# Patient Record
Sex: MALE | Race: WHITE | NOT HISPANIC OR LATINO | URBAN - METROPOLITAN AREA
[De-identification: names, ages, dates, MRNs, and addresses within clinical notes are randomized per-mention and may not be internally consistent; named-entity substitution may affect disease eponyms.]

---

## 2017-09-06 ENCOUNTER — IMPORTED ENCOUNTER (OUTPATIENT)
Dept: URBAN - METROPOLITAN AREA CLINIC 9 | Facility: CLINIC | Age: 72
End: 2017-09-06

## 2017-12-07 NOTE — PATIENT DISCUSSION
ECTROPION, OU VISUALLY SIGNIFICANT TO THE PATIENT. RECOMMEND ARTIFICIAL TEARS AND RX EMYCIN HS OU. FOLLOW UP AS SCHEDULED. DISCUSSED OPTION OF CONSULT FOR SURGERY WITH DR Stan Greenwood. THE FAMILY WILL CONSIDER THIS OPTION AND LET US KNOW. WRITTEN RX FOR E-MYCIN GIVEN TODAY.

## 2018-05-04 ENCOUNTER — IMPORTED ENCOUNTER (OUTPATIENT)
Dept: URBAN - METROPOLITAN AREA CLINIC 9 | Facility: CLINIC | Age: 73
End: 2018-05-04

## 2018-08-08 ENCOUNTER — IMPORTED ENCOUNTER (OUTPATIENT)
Dept: URBAN - METROPOLITAN AREA CLINIC 9 | Facility: CLINIC | Age: 73
End: 2018-08-08

## 2018-10-22 NOTE — PATIENT DISCUSSION
Continue: Refresh Celluvisc (carboxymethylcellulose sodium): dropperette,gel: 1% 1 drop five times a day into right eye

## 2018-10-22 NOTE — PATIENT DISCUSSION
POSTERIOR VITREOUS DETACHMENT, OU:  NO HOLES. NO TEARS. RETINAL  DETACHMENT WARNINGS DISCUSSED. Eboni Cárdenas RETURN FOR FOLLOW-UP AS SCHEDULED.

## 2018-10-22 NOTE — PATIENT DISCUSSION
ECTROPION, OU: RX ARTIFICIAL TEARS AS NEEDED IN BOTH EYES. CONSULT DR Blayne Roldan FOR SURGERY WHEN FAMILY DECIDES PATIENT IS READY.

## 2018-12-07 ENCOUNTER — IMPORTED ENCOUNTER (OUTPATIENT)
Dept: URBAN - METROPOLITAN AREA CLINIC 9 | Facility: CLINIC | Age: 73
End: 2018-12-07

## 2019-05-02 ENCOUNTER — IMPORTED ENCOUNTER (OUTPATIENT)
Dept: URBAN - METROPOLITAN AREA CLINIC 9 | Facility: CLINIC | Age: 74
End: 2019-05-02

## 2019-08-09 ENCOUNTER — IMPORTED ENCOUNTER (OUTPATIENT)
Dept: URBAN - METROPOLITAN AREA CLINIC 9 | Facility: CLINIC | Age: 74
End: 2019-08-09

## 2019-08-14 ENCOUNTER — IMPORTED ENCOUNTER (OUTPATIENT)
Dept: URBAN - METROPOLITAN AREA CLINIC 9 | Facility: CLINIC | Age: 74
End: 2019-08-14

## 2019-11-11 ENCOUNTER — IMPORTED ENCOUNTER (OUTPATIENT)
Dept: URBAN - METROPOLITAN AREA CLINIC 9 | Facility: CLINIC | Age: 74
End: 2019-11-11

## 2020-01-28 ENCOUNTER — IMPORTED ENCOUNTER (OUTPATIENT)
Dept: URBAN - METROPOLITAN AREA CLINIC 9 | Facility: CLINIC | Age: 75
End: 2020-01-28

## 2020-01-31 ENCOUNTER — IMPORTED ENCOUNTER (OUTPATIENT)
Dept: URBAN - METROPOLITAN AREA CLINIC 9 | Facility: CLINIC | Age: 75
End: 2020-01-31

## 2020-06-16 ENCOUNTER — MOHS SURGERY-ROUTINE (OUTPATIENT)
Dept: URBAN - METROPOLITAN AREA CLINIC 12 | Facility: CLINIC | Age: 75
Setting detail: DERMATOLOGY
End: 2020-06-16

## 2020-06-16 DIAGNOSIS — D48.5 NEOPLASM OF UNCERTAIN BEHAVIOR OF SKIN: ICD-10-CM

## 2020-06-16 PROCEDURE — 17311 MOHS 1 STAGE H/N/HF/G: CPT

## 2020-06-16 PROCEDURE — 17312 MOHS ADDL STAGE: CPT

## 2020-06-16 PROCEDURE — 14061 TIS TRNFR E/N/E/L10.1-30SQCM: CPT

## 2020-06-16 RX ORDER — SULFAMETHOXAZOLE AND TRIMETHOPRIM 800; 160 MG/1; MG/1
1 TABLET TABLET ORAL BID
Qty: 10 | Refills: 0
Start: 2020-06-16

## 2020-06-23 ENCOUNTER — SUTURE REMOVAL (OUTPATIENT)
Dept: URBAN - METROPOLITAN AREA CLINIC 12 | Facility: CLINIC | Age: 75
Setting detail: DERMATOLOGY
End: 2020-06-23

## 2020-06-23 DIAGNOSIS — L57.0 ACTINIC KERATOSIS: ICD-10-CM

## 2020-06-23 PROCEDURE — 99024 POSTOP FOLLOW-UP VISIT: CPT

## 2020-08-12 ENCOUNTER — PREPPED CHART (OUTPATIENT)
Dept: URBAN - METROPOLITAN AREA CLINIC 9 | Facility: CLINIC | Age: 75
End: 2020-08-12

## 2020-08-12 ENCOUNTER — IMPORTED ENCOUNTER (OUTPATIENT)
Dept: URBAN - METROPOLITAN AREA CLINIC 9 | Facility: CLINIC | Age: 75
End: 2020-08-12

## 2021-10-11 ENCOUNTER — ESTABLISHED COMPREHENSIVE EXAM (OUTPATIENT)
Dept: URBAN - METROPOLITAN AREA CLINIC 9 | Facility: CLINIC | Age: 76
End: 2021-10-11

## 2021-10-11 DIAGNOSIS — H40.033: ICD-10-CM

## 2021-10-11 DIAGNOSIS — H25.13: ICD-10-CM

## 2021-10-11 DIAGNOSIS — H33.312: ICD-10-CM

## 2021-10-11 PROCEDURE — 92015 DETERMINE REFRACTIVE STATE: CPT

## 2021-10-11 PROCEDURE — 92133 CPTRZD OPH DX IMG PST SGM ON: CPT

## 2021-10-11 PROCEDURE — 92014 COMPRE OPH EXAM EST PT 1/>: CPT

## 2021-10-11 ASSESSMENT — VISUAL ACUITY
OD_SC: 20/80
OD_CC: 20/25
OS_SC: 20/80-1
OU_SC: 20/80
OS_CC: 20/30-2
OU_CC: 20/25-1

## 2021-10-11 ASSESSMENT — KERATOMETRY
OD_K1POWER_DIOPTERS: 44.25
OD_K2POWER_DIOPTERS: 45.00
OD_AXISANGLE_DEGREES: 113
OS_AXISANGLE_DEGREES: 107
OD_AXISANGLE2_DEGREES: 23
OS_K2POWER_DIOPTERS: 45.75
OS_AXISANGLE2_DEGREES: 17
OS_K1POWER_DIOPTERS: 45.25

## 2021-10-11 ASSESSMENT — TONOMETRY
OS_IOP_MMHG: 17
OD_IOP_MMHG: 20

## 2021-10-18 ASSESSMENT — KERATOMETRY
OD_AXISANGLE2_DEGREES: 171
OD_AXISANGLE2_DEGREES: 163
OS_K1POWER_DIOPTERS: 45
OS_AXISANGLE_DEGREES: 100
OS_K2POWER_DIOPTERS: 45.75
OD_K1POWER_DIOPTERS: 44.75
OS_K1POWER_DIOPTERS: 45.25
OS_AXISANGLE_DEGREES: 90
OD_K1POWER_DIOPTERS: 44.75
OS_AXISANGLE2_DEGREES: 10
OD_K2POWER_DIOPTERS: 45.25
OD_AXISANGLE_DEGREES: 73
OS_AXISANGLE2_DEGREES: 180
OD_K2POWER_DIOPTERS: 45
OD_AXISANGLE_DEGREES: 81
OS_K2POWER_DIOPTERS: 45.75

## 2021-10-18 ASSESSMENT — VISUAL ACUITY
OS_CC: 20/30 +2 SN
OD_CC: 20/20 - SN
OD_CC: 20/20 - SN
OD_CC: 20/25 +2 SN
OS_CC: 20/25 SN
OS_CC: 20/25 - SN
OS_CC: 20/25 -2 SN
OD_CC: 20/20 SN
OD_CC: 20/20 SN
OS_SC: 20/60 SN
OD_SC: 20/200 SN
OD_CC: 20/20 SN
OD_CC: 20/20 - SN
OD_SC: 20/60 + SN
OS_CC: 20/20 -2 SN
OD_CC: 20/25 -2 SN
OD_CC: 20/25 +2 SN
OS_SC: 20/60 -2 SN
OS_CC: 20/20 -2 SN
OD_CC: 20/20 - SN
OD_CC: 20/25 +2 SN
OS_CC: 20/25 -2 SN
OD_CC: 20/20 - SN
OS_SC: 20/100 - SN
OS_CC: 20/20 - SN
OD_SC: 20/100 - SN
OS_CC: 20/20 - SN
OS_CC: 20/25 + SN
OS_CC: 20/20 SN

## 2021-10-18 ASSESSMENT — TONOMETRY
OD_IOP_MMHG: 21
OS_IOP_MMHG: 12
OS_IOP_MMHG: 16
OS_IOP_MMHG: 17
OS_IOP_MMHG: 17
OS_IOP_MMHG: 16
OS_IOP_MMHG: 17
OD_IOP_MMHG: 16
OD_IOP_MMHG: 19
OD_IOP_MMHG: 16
OD_IOP_MMHG: 16
OS_IOP_MMHG: 14
OS_IOP_MMHG: 20
OD_IOP_MMHG: 22
OD_IOP_MMHG: 21
OD_IOP_MMHG: 17
OS_IOP_MMHG: 14
OS_IOP_MMHG: 21
OD_IOP_MMHG: 15
OD_IOP_MMHG: 19

## 2022-02-21 ENCOUNTER — DIAGNOSTICS ONLY (OUTPATIENT)
Dept: URBAN - METROPOLITAN AREA CLINIC 9 | Facility: CLINIC | Age: 77
End: 2022-02-21

## 2022-02-21 DIAGNOSIS — H40.033: ICD-10-CM

## 2022-02-21 PROCEDURE — 92083 EXTENDED VISUAL FIELD XM: CPT

## 2022-02-21 ASSESSMENT — KERATOMETRY
OS_AXISANGLE_DEGREES: 107
OD_K1POWER_DIOPTERS: 44.25
OD_AXISANGLE_DEGREES: 113
OD_K2POWER_DIOPTERS: 45.00
OS_K1POWER_DIOPTERS: 45.25
OD_AXISANGLE2_DEGREES: 23
OS_AXISANGLE2_DEGREES: 17
OS_K2POWER_DIOPTERS: 45.75

## 2022-04-25 ENCOUNTER — ESTABLISHED PATIENT (OUTPATIENT)
Dept: URBAN - METROPOLITAN AREA CLINIC 9 | Facility: CLINIC | Age: 77
End: 2022-04-25

## 2022-04-25 DIAGNOSIS — H40.033: ICD-10-CM

## 2022-04-25 PROCEDURE — 99213 OFFICE O/P EST LOW 20 MIN: CPT

## 2022-04-25 ASSESSMENT — TONOMETRY
OS_IOP_MMHG: 17
OD_IOP_MMHG: 16

## 2022-04-25 ASSESSMENT — VISUAL ACUITY
OD_SC: 20/25
OU_SC: 20/20-1
OS_SC: 20/25-2

## 2022-06-20 RX ORDER — SILDENAFIL 100 MG/1
TABLET, FILM COATED ORAL
COMMUNITY

## 2022-06-20 RX ORDER — LANOLIN ALCOHOL/MO/W.PET/CERES
3 CREAM (GRAM) TOPICAL NIGHTLY PRN
COMMUNITY

## 2022-06-20 RX ORDER — CLORAZEPATE DIPOTASSIUM 7.5 MG/1
TABLET ORAL
COMMUNITY
Start: 2021-11-18

## 2022-06-20 RX ORDER — LOSARTAN POTASSIUM 50 MG/1
TABLET ORAL
COMMUNITY
Start: 2021-08-31 | End: 2022-10-25 | Stop reason: SDUPTHER

## 2022-06-20 RX ORDER — LATANOPROST 50 UG/ML
SOLUTION/ DROPS OPHTHALMIC
COMMUNITY

## 2022-06-20 RX ORDER — AMLODIPINE BESYLATE 5 MG/1
10 TABLET ORAL DAILY
COMMUNITY
Start: 2021-11-10 | End: 2022-10-25 | Stop reason: SDUPTHER

## 2022-06-20 RX ORDER — DIPHENHYDRAMINE HCL 25 MG
TABLET ORAL
COMMUNITY
End: 2022-10-25

## 2022-09-12 PROBLEM — M75.01 ADHESIVE CAPSULITIS OF RIGHT SHOULDER: Status: ACTIVE | Noted: 2022-09-12

## 2022-09-13 PROBLEM — M72.0 DUPUYTREN'S CONTRACTURE OF RIGHT HAND: Status: ACTIVE | Noted: 2022-09-13

## 2022-10-17 ENCOUNTER — PREPPED CHART (OUTPATIENT)
Dept: URBAN - METROPOLITAN AREA CLINIC 9 | Facility: CLINIC | Age: 77
End: 2022-10-17

## 2022-10-19 PROBLEM — M25.511 PAIN IN RIGHT SHOULDER: Status: ACTIVE | Noted: 2022-10-19

## 2022-10-19 PROBLEM — G25.81 RESTLESS LEGS SYNDROME: Status: ACTIVE | Noted: 2022-10-19

## 2022-10-19 PROBLEM — E78.00 HYPERCHOLESTEREMIA: Status: ACTIVE | Noted: 2022-10-19

## 2022-10-19 PROBLEM — G47.33 OBSTRUCTIVE SLEEP APNEA: Status: ACTIVE | Noted: 2022-10-19

## 2022-10-19 PROBLEM — G89.29 OTHER CHRONIC PAIN: Status: ACTIVE | Noted: 2022-10-19

## 2022-10-19 PROBLEM — H40.1131 PRIMARY OPEN ANGLE GLAUCOMA OF BOTH EYES, MILD STAGE: Status: ACTIVE | Noted: 2022-10-19

## 2022-10-19 PROBLEM — K21.9 GASTROESOPHAGEAL REFLUX DISEASE: Status: ACTIVE | Noted: 2022-10-19

## 2022-10-19 PROBLEM — M25.512 PAIN IN LEFT SHOULDER: Status: ACTIVE | Noted: 2022-10-19

## 2022-10-19 PROBLEM — M16.11 ARTHRITIS OF RIGHT HIP: Status: ACTIVE | Noted: 2022-10-19

## 2022-10-19 PROBLEM — N40.1 BENIGN PROSTATIC HYPERPLASIA WITH LOWER URINARY TRACT SYMPTOMS: Status: ACTIVE | Noted: 2022-10-19

## 2022-10-19 PROBLEM — N52.9 ERECTILE DYSFUNCTION: Status: ACTIVE | Noted: 2022-10-19

## 2022-10-19 PROBLEM — R91.1 PULMONARY NODULE: Status: ACTIVE | Noted: 2022-10-19

## 2022-10-19 PROBLEM — H40.9 GLAUCOMA OF BOTH EYES: Status: ACTIVE | Noted: 2022-10-19

## 2022-10-19 PROBLEM — F41.9 ANXIETY: Status: ACTIVE | Noted: 2022-10-19

## 2022-10-19 PROBLEM — S79.911A INJURY OF RIGHT HIP: Status: ACTIVE | Noted: 2022-10-19

## 2022-10-19 PROBLEM — J31.2 SORE THROAT, CHRONIC: Status: ACTIVE | Noted: 2022-10-19

## 2022-10-19 PROBLEM — R55 SYNCOPE: Status: ACTIVE | Noted: 2022-10-19

## 2022-10-19 PROBLEM — I10 ESSENTIAL HYPERTENSION: Status: ACTIVE | Noted: 2022-10-19

## 2023-05-10 ENCOUNTER — ESTABLISHED PATIENT (OUTPATIENT)
Dept: URBAN - METROPOLITAN AREA CLINIC 9 | Facility: CLINIC | Age: 78
End: 2023-05-10

## 2023-05-10 DIAGNOSIS — H33.312: ICD-10-CM

## 2023-05-10 DIAGNOSIS — H43.813: ICD-10-CM

## 2023-05-10 DIAGNOSIS — D31.32: ICD-10-CM

## 2023-05-10 PROCEDURE — 92014 COMPRE OPH EXAM EST PT 1/>: CPT

## 2023-05-10 PROCEDURE — 92250 FUNDUS PHOTOGRAPHY W/I&R: CPT

## 2023-05-10 ASSESSMENT — VISUAL ACUITY
OS_PH: 20/30-1
OD_CC: 20/25-1
OS_CC: 20/40-1

## 2023-05-10 ASSESSMENT — TONOMETRY
OS_IOP_MMHG: 13
OD_IOP_MMHG: 14

## 2024-02-12 ENCOUNTER — ESTABLISHED PATIENT (OUTPATIENT)
Facility: LOCATION | Age: 79
End: 2024-02-12

## 2024-02-12 DIAGNOSIS — H40.033: ICD-10-CM

## 2024-02-12 DIAGNOSIS — H25.13: ICD-10-CM

## 2024-02-12 DIAGNOSIS — H52.223: ICD-10-CM

## 2024-02-12 DIAGNOSIS — H43.813: ICD-10-CM

## 2024-02-12 DIAGNOSIS — D31.32: ICD-10-CM

## 2024-02-12 PROCEDURE — 92015 DETERMINE REFRACTIVE STATE: CPT

## 2024-02-12 PROCEDURE — 92133 CPTRZD OPH DX IMG PST SGM ON: CPT

## 2024-02-12 PROCEDURE — 92014 COMPRE OPH EXAM EST PT 1/>: CPT

## 2024-02-12 ASSESSMENT — KERATOMETRY
OS_K2POWER_DIOPTERS: 46.00
OD_K2POWER_DIOPTERS: 45.25
OS_AXISANGLE2_DEGREES: 175
OD_AXISANGLE_DEGREES: 84
OD_K1POWER_DIOPTERS: 44.50
OD_AXISANGLE2_DEGREES: 174
OS_K1POWER_DIOPTERS: 45.00
OS_AXISANGLE_DEGREES: 85

## 2024-02-12 ASSESSMENT — VISUAL ACUITY
OS_GLARE: 20/200
OD_GLARE: 20/80
OS_CC: 20/30
OD_SC: 20/60
OD_CC: 20/30
OS_SC: 20/200

## 2024-02-12 ASSESSMENT — TONOMETRY
OD_IOP_MMHG: 18
OS_IOP_MMHG: 18

## 2024-02-16 ENCOUNTER — TECH ONLY (OUTPATIENT)
Facility: LOCATION | Age: 79
End: 2024-02-16

## 2024-02-16 DIAGNOSIS — H40.033: ICD-10-CM

## 2024-02-16 PROCEDURE — 92083 EXTENDED VISUAL FIELD XM: CPT

## 2024-08-16 ENCOUNTER — FOLLOW UP (OUTPATIENT)
Facility: LOCATION | Age: 79
End: 2024-08-16

## 2024-08-16 DIAGNOSIS — H40.033: ICD-10-CM

## 2024-08-16 PROCEDURE — 99213 OFFICE O/P EST LOW 20 MIN: CPT

## 2024-08-16 ASSESSMENT — VISUAL ACUITY
OS_SC: 20/100
OU_SC: J1
OD_SC: J2
OU_SC: 20/60
OD_SC: 20/40
OS_SC: J2

## 2024-08-16 ASSESSMENT — TONOMETRY
OD_IOP_MMHG: 20
OD_IOP_MMHG: 23
OS_IOP_MMHG: 20
OS_IOP_MMHG: 19

## 2025-02-13 ENCOUNTER — COMPREHENSIVE EXAM (OUTPATIENT)
Age: 80
End: 2025-02-13

## 2025-02-13 DIAGNOSIS — H25.13: ICD-10-CM

## 2025-02-13 DIAGNOSIS — H52.223: ICD-10-CM

## 2025-02-13 DIAGNOSIS — D31.32: ICD-10-CM

## 2025-02-13 DIAGNOSIS — H40.033: ICD-10-CM

## 2025-02-13 PROCEDURE — 92014 COMPRE OPH EXAM EST PT 1/>: CPT

## 2025-02-13 PROCEDURE — 92015 DETERMINE REFRACTIVE STATE: CPT

## 2025-02-13 PROCEDURE — 92133 CPTRZD OPH DX IMG PST SGM ON: CPT

## 2025-05-29 ENCOUNTER — APPOINTMENT (OUTPATIENT)
Age: 80
Setting detail: DERMATOLOGY
End: 2025-05-29

## 2025-05-29 PROBLEM — C44.91 BASAL CELL CARCINOMA OF SKIN, UNSPECIFIED: Status: ACTIVE | Noted: 2025-05-29

## 2025-05-29 PROBLEM — C44.311 BASAL CELL CARCINOMA OF SKIN OF NOSE: Status: ACTIVE | Noted: 2025-05-29

## 2025-05-29 PROCEDURE — 14061 TIS TRNFR E/N/E/L10.1-30SQCM: CPT

## 2025-05-29 PROCEDURE — OTHER MOHS SURGERY: OTHER

## 2025-05-29 PROCEDURE — 99204 OFFICE O/P NEW MOD 45 MIN: CPT | Mod: 57

## 2025-05-29 PROCEDURE — OTHER SURGICAL DECISION MAKING: OTHER

## 2025-05-29 PROCEDURE — OTHER MIPS QUALITY: OTHER

## 2025-05-29 PROCEDURE — 17311 MOHS 1 STAGE H/N/HF/G: CPT

## 2025-05-29 PROCEDURE — 17312 MOHS ADDL STAGE: CPT

## 2025-05-29 PROCEDURE — OTHER COUNSELING: OTHER

## 2025-05-29 PROCEDURE — OTHER PRESCRIPTION: OTHER

## 2025-05-29 RX ORDER — CLINDAMYCIN HYDROCHLORIDE 300 MG/1
CAPSULE ORAL
Qty: 21 | Refills: 0 | Status: ERX | COMMUNITY
Start: 2025-05-29

## 2025-06-10 ENCOUNTER — APPOINTMENT (OUTPATIENT)
Age: 80
Setting detail: DERMATOLOGY
End: 2025-06-11

## 2025-06-10 DIAGNOSIS — Z48.02 ENCOUNTER FOR REMOVAL OF SUTURES: ICD-10-CM

## 2025-06-10 PROCEDURE — OTHER MIPS QUALITY: OTHER

## 2025-06-10 PROCEDURE — OTHER SUTURE REMOVAL (GLOBAL PERIOD): OTHER

## 2025-06-10 ASSESSMENT — LOCATION SIMPLE DESCRIPTION DERM: LOCATION SIMPLE: NOSE

## 2025-06-10 ASSESSMENT — LOCATION ZONE DERM: LOCATION ZONE: NOSE

## 2025-06-10 ASSESSMENT — LOCATION DETAILED DESCRIPTION DERM: LOCATION DETAILED: NASAL DORSUM
